# Patient Record
Sex: FEMALE | Race: BLACK OR AFRICAN AMERICAN | NOT HISPANIC OR LATINO
[De-identification: names, ages, dates, MRNs, and addresses within clinical notes are randomized per-mention and may not be internally consistent; named-entity substitution may affect disease eponyms.]

---

## 2017-02-13 PROBLEM — Z00.00 ENCOUNTER FOR PREVENTIVE HEALTH EXAMINATION: Status: ACTIVE | Noted: 2017-02-13

## 2017-02-14 ENCOUNTER — APPOINTMENT (OUTPATIENT)
Dept: PULMONOLOGY | Facility: CLINIC | Age: 77
End: 2017-02-14

## 2017-02-14 VITALS
WEIGHT: 154 LBS | BODY MASS INDEX: 30.23 KG/M2 | DIASTOLIC BLOOD PRESSURE: 102 MMHG | SYSTOLIC BLOOD PRESSURE: 152 MMHG | TEMPERATURE: 98.1 F | OXYGEN SATURATION: 88 % | HEIGHT: 60 IN | HEART RATE: 72 BPM

## 2017-02-14 RX ORDER — SODIUM CHLORIDE 0.65 %
0.65 AEROSOL, SPRAY (ML) NASAL TWICE DAILY
Qty: 1 | Refills: 0 | Status: ACTIVE | COMMUNITY
Start: 2017-02-14 | End: 1900-01-01

## 2017-02-14 RX ORDER — AZELASTINE HYDROCHLORIDE 137 UG/1
0.1 SPRAY, METERED NASAL TWICE DAILY
Qty: 1 | Refills: 3 | Status: ACTIVE | COMMUNITY
Start: 2017-02-14 | End: 1900-01-01

## 2017-02-20 VITALS — DIASTOLIC BLOOD PRESSURE: 92 MMHG | SYSTOLIC BLOOD PRESSURE: 156 MMHG

## 2017-03-17 ENCOUNTER — RX RENEWAL (OUTPATIENT)
Age: 77
End: 2017-03-17

## 2017-03-21 ENCOUNTER — APPOINTMENT (OUTPATIENT)
Dept: PULMONOLOGY | Facility: CLINIC | Age: 77
End: 2017-03-21

## 2017-04-04 ENCOUNTER — APPOINTMENT (OUTPATIENT)
Dept: PULMONOLOGY | Facility: CLINIC | Age: 77
End: 2017-04-04

## 2017-04-11 ENCOUNTER — APPOINTMENT (OUTPATIENT)
Dept: PULMONOLOGY | Facility: CLINIC | Age: 77
End: 2017-04-11

## 2017-04-11 VITALS
HEIGHT: 60 IN | BODY MASS INDEX: 30.23 KG/M2 | OXYGEN SATURATION: 91 % | WEIGHT: 154 LBS | DIASTOLIC BLOOD PRESSURE: 88 MMHG | HEART RATE: 69 BPM | SYSTOLIC BLOOD PRESSURE: 122 MMHG

## 2017-05-02 ENCOUNTER — RX RENEWAL (OUTPATIENT)
Age: 77
End: 2017-05-02

## 2017-06-13 ENCOUNTER — APPOINTMENT (OUTPATIENT)
Dept: PULMONOLOGY | Facility: CLINIC | Age: 77
End: 2017-06-13

## 2017-06-13 ENCOUNTER — OTHER (OUTPATIENT)
Age: 77
End: 2017-06-13

## 2017-06-13 VITALS
HEART RATE: 67 BPM | WEIGHT: 168 LBS | BODY MASS INDEX: 32.98 KG/M2 | OXYGEN SATURATION: 83 % | DIASTOLIC BLOOD PRESSURE: 90 MMHG | SYSTOLIC BLOOD PRESSURE: 126 MMHG | HEIGHT: 60 IN

## 2017-06-13 VITALS — OXYGEN SATURATION: 90 %

## 2017-06-23 ENCOUNTER — RX RENEWAL (OUTPATIENT)
Age: 77
End: 2017-06-23

## 2017-08-18 ENCOUNTER — RECORD ABSTRACTING (OUTPATIENT)
Age: 77
End: 2017-08-18

## 2017-08-18 DIAGNOSIS — Z87.891 PERSONAL HISTORY OF NICOTINE DEPENDENCE: ICD-10-CM

## 2020-10-07 ENCOUNTER — APPOINTMENT (OUTPATIENT)
Dept: PULMONOLOGY | Facility: CLINIC | Age: 80
End: 2020-10-07
Payer: MEDICARE

## 2020-10-07 VITALS — SYSTOLIC BLOOD PRESSURE: 102 MMHG | DIASTOLIC BLOOD PRESSURE: 61 MMHG

## 2020-10-07 VITALS — TEMPERATURE: 97.5 F | RESPIRATION RATE: 16 BRPM

## 2020-10-07 DIAGNOSIS — J44.1 CHRONIC OBSTRUCTIVE PULMONARY DISEASE WITH (ACUTE) EXACERBATION: ICD-10-CM

## 2020-10-07 PROCEDURE — 99215 OFFICE O/P EST HI 40 MIN: CPT

## 2020-10-08 PROBLEM — J44.1 COPD WITH EXACERBATION: Status: ACTIVE | Noted: 2020-10-08

## 2020-10-08 NOTE — REVIEW OF SYSTEMS
[Cough] : cough [Dyspnea] : dyspnea [Fever] : no fever [Nasal Congestion] : no nasal congestion [Postnasal Drip] : no postnasal drip [Chest Discomfort] : no chest discomfort [Palpitations] : no palpitations [Hay Fever] : no hay fever

## 2020-10-08 NOTE — DISCUSSION/SUMMARY
[FreeTextEntry1] : Acute respiratory failure, COPD\par \par I suspect she may have hypercapnic as well as hypoxic respiratory failure.\par \par She may have element of CHF and pulmonary HTN\par \par Rule out MI, rule out PE, rule out CHF, pneumonia\par \par PLAN\par \par i have recommended she be transferred to the ER for further testing.\par \par Pt agreed EMS called and have transported her\par \par i have discussed her care with ER at [facility].\par \par \par Angel Amador MD [facility]

## 2020-10-08 NOTE — PHYSICAL EXAM
[No Acute Distress] : no acute distress [II] : Mallampati Class: II [Normal Appearance] : normal appearance [No Neck Mass] : no neck mass [No JVD] : no jvd [Normal Rate/Rhythm] : normal rate/rhythm [Normal S1, S2] : normal s1, s2 [No Murmurs] : no murmurs [Benign] : benign [No Masses] : no masses [No HSM] : no hsm [Normal Bowel Sounds] : normal bowel sounds [No Clubbing] : no clubbing [No Edema] : no edema [Oriented x3] : oriented x3 [TextBox_68] : diminished distant, unlabored

## 2020-10-08 NOTE — HISTORY OF PRESENT ILLNESS
[TextBox_4] : 80 year old woman last seen in 2017.  she has bee treated for COPD.  She has HTN and gout.  A CXR in past suggested pulmonary congestion.  She has followed with her primary doctor, Lobo Bonilla.\par She has been using albuterol and Arnuity.  she also takes furosemide 80 mg per day, allopurinol 100 mg and amlodipine 10 mg per day.  her BP has been in better control.\par \par She has had increased dyspnea for about 4 weeks, with some sputum production.  She denies chest pain or tightness, pedal edema, fever.\par \par \par She has had borderline O2 saturation 90% in past.  During prior visits, her respiratory status had appeared to improve. \par \par Today she presented and found to have O2 saturation on pulse oximeter of 46%, confirmed on 2 oximeters.  It doug to 92 % on 4 l NC\par \par She is awake alert and comfortable.\par \par

## 2020-10-21 ENCOUNTER — APPOINTMENT (OUTPATIENT)
Dept: PULMONOLOGY | Facility: CLINIC | Age: 80
End: 2020-10-21
Payer: MEDICARE

## 2020-10-21 VITALS
HEART RATE: 87 BPM | BODY MASS INDEX: 29.3 KG/M2 | SYSTOLIC BLOOD PRESSURE: 148 MMHG | DIASTOLIC BLOOD PRESSURE: 72 MMHG | TEMPERATURE: 97.9 F | WEIGHT: 150 LBS | OXYGEN SATURATION: 69 %

## 2020-10-21 VITALS — OXYGEN SATURATION: 98 % | HEART RATE: 71 BPM

## 2020-10-21 PROCEDURE — 99072 ADDL SUPL MATRL&STAF TM PHE: CPT

## 2020-10-21 PROCEDURE — 99214 OFFICE O/P EST MOD 30 MIN: CPT | Mod: 25

## 2020-10-22 NOTE — DISCUSSION/SUMMARY
[FreeTextEntry1] : COPD, emphysema interstitial lung disease\par \par 8 mm right lung nodule\par \par PLAN\par \par Increase furosemide to 20 mg BID, continue spironolactone 25 mg per day\par \par Start on Symbicort  2 puffs twice per day with spacer\par \par stop Arnuity\par \par use albuterol MDI  as needed\par \par use oxygen 2-3 lpm at all times\par \par BIPAP at night\par \par monitor your oxygen level.  It should be over 88%\par \par Start nebulized albuterol.\par \par Angel Amador MD [facility] \par \par \par

## 2020-10-22 NOTE — PHYSICAL EXAM
[No Acute Distress] : no acute distress [II] : Mallampati Class: II [Normal Appearance] : normal appearance [No Neck Mass] : no neck mass [No JVD] : no jvd [Normal Rate/Rhythm] : normal rate/rhythm [Normal S1, S2] : normal s1, s2 [No Murmurs] : no murmurs [Benign] : benign [No Masses] : no masses [No HSM] : no hsm [Normal Bowel Sounds] : normal bowel sounds [No Clubbing] : no clubbing [Oriented x3] : oriented x3 [TextBox_68] : diminished distant, unlabored [TextBox_105] : 2+ edema of ankles

## 2020-10-22 NOTE — HISTORY OF PRESENT ILLNESS
[TextBox_4] : She was hospitalized at [facility].  she was diagnosed with CHF, COPD/emphysema, hypoxic hypercapnic chronic respiratory failure.  She was started on BIPAP and was discharge on furosemide, spironolactone and bronchodilators.  Lower extremity edema resolved.\par \par Echo demonstrated diastolic dysfunction,hyperdynamic LV. \par \par She returns follow up, not using portable oxygen.  O2 sat 69%, not in distress.  She was put on oxygen in our office.\par \par She has noted increased lower extremity edema since hospital discharge\par \par

## 2020-11-11 ENCOUNTER — APPOINTMENT (OUTPATIENT)
Dept: PULMONOLOGY | Facility: CLINIC | Age: 80
End: 2020-11-11
Payer: MEDICARE

## 2020-11-11 VITALS
RESPIRATION RATE: 17 BRPM | HEART RATE: 94 BPM | SYSTOLIC BLOOD PRESSURE: 102 MMHG | OXYGEN SATURATION: 86 % | TEMPERATURE: 97 F | DIASTOLIC BLOOD PRESSURE: 70 MMHG

## 2020-11-11 VITALS — OXYGEN SATURATION: 86 %

## 2020-11-11 DIAGNOSIS — Z23 ENCOUNTER FOR IMMUNIZATION: ICD-10-CM

## 2020-11-11 PROCEDURE — 90732 PPSV23 VACC 2 YRS+ SUBQ/IM: CPT

## 2020-11-11 PROCEDURE — G0009: CPT

## 2020-11-11 PROCEDURE — 99072 ADDL SUPL MATRL&STAF TM PHE: CPT

## 2020-11-11 PROCEDURE — 99214 OFFICE O/P EST MOD 30 MIN: CPT | Mod: 25

## 2020-11-11 RX ORDER — FLUTICASONE FUROATE 100 UG/1
100 POWDER RESPIRATORY (INHALATION) DAILY
Qty: 1 | Refills: 2 | Status: DISCONTINUED | COMMUNITY
Start: 2017-06-23 | End: 2020-11-11

## 2020-11-12 NOTE — REASON FOR VISIT
[Follow-Up] : a follow-up visit [COPD] : COPD [TextBox_44] : hypoventilation syndrome,LE edema, hypoxia, chronic respiratory failure

## 2020-11-12 NOTE — PHYSICAL EXAM
[No Acute Distress] : no acute distress [II] : Mallampati Class: II [Normal Appearance] : normal appearance [No Neck Mass] : no neck mass [No JVD] : no jvd [Normal Rate/Rhythm] : normal rate/rhythm [Normal S1, S2] : normal s1, s2 [No Murmurs] : no murmurs [Benign] : benign [No Masses] : no masses [No HSM] : no hsm [Normal Bowel Sounds] : normal bowel sounds [No Clubbing] : no clubbing [Oriented x3] : oriented x3 [TextBox_68] : diminished distant, unlabored [TextBox_105] : 1-2+ edema of ankles and pretibilal

## 2020-11-12 NOTE — DISCUSSION/SUMMARY
[FreeTextEntry1] : COPD, emphysema interstitial lung disease\par \par hypercapnic chronic respiratory failure, with hypoxia\par \par Ct has shown 8 mm right lung nodule\par \par PLAN\par \par Continue furosemide to 40 mg BID, spironolactone 25 mg per day\par \par Continue Symbicort 160/4.5 2 p twice per day with spacer\par \par Duoneb twice per day via nebulizer\par \par \par She has had the influenza vaccine this season. in October\par \par Given Pneumovax 23 today\par \par [facility]

## 2020-11-12 NOTE — HISTORY OF PRESENT ILLNESS
[TextBox_4] : 80 year old woman returns for follow up.\par \par She was hospitalized at [facility].  She was diagnosed with CHF, COPD/emphysema, hypoxic hypercapnic chronic respiratory failure.  She was started on BIPAP and was discharged on furosemide, spironolactone and bronchodilators.  Lower extremity edema resolved.\par \par Echo demonstrated diastolic dysfunction,hyperdynamic LV. \par \par She returns follow up,  O2 sat 86 % on room air, improved. \par \par She has noted increased lower extremity edema since hospital discharge, somewhat better now on increased dose of diuretic. \par \par \par She is able to wear shoes.  \par \par She has not received portable oxygen and has not received albuterol solution.\par \par \par \par PMH\par \par HTN

## 2020-12-07 RX ORDER — TIOTROPIUM BROMIDE 18 UG/1
18 CAPSULE ORAL; RESPIRATORY (INHALATION) DAILY
Qty: 1 | Refills: 3 | Status: DISCONTINUED | COMMUNITY
Start: 2017-02-14 | End: 2020-12-07

## 2021-01-13 ENCOUNTER — APPOINTMENT (OUTPATIENT)
Dept: PULMONOLOGY | Facility: CLINIC | Age: 81
End: 2021-01-13
Payer: COMMERCIAL

## 2021-01-13 VITALS
SYSTOLIC BLOOD PRESSURE: 102 MMHG | DIASTOLIC BLOOD PRESSURE: 81 MMHG | TEMPERATURE: 98 F | WEIGHT: 146 LBS | RESPIRATION RATE: 18 BRPM | HEIGHT: 60 IN | HEART RATE: 81 BPM | BODY MASS INDEX: 28.66 KG/M2 | OXYGEN SATURATION: 88 %

## 2021-01-13 PROCEDURE — 99213 OFFICE O/P EST LOW 20 MIN: CPT

## 2021-01-18 LAB
ALBUMIN SERPL ELPH-MCNC: 4.7 G/DL
ALP BLD-CCNC: 118 U/L
ALT SERPL-CCNC: 13 U/L
ANION GAP SERPL CALC-SCNC: 16 MMOL/L
AST SERPL-CCNC: 16 U/L
BASOPHILS # BLD AUTO: 0.08 K/UL
BASOPHILS NFR BLD AUTO: 1.3 %
BILIRUB SERPL-MCNC: 0.4 MG/DL
BUN SERPL-MCNC: 27 MG/DL
CALCIUM SERPL-MCNC: 10 MG/DL
CHLORIDE SERPL-SCNC: 97 MMOL/L
CO2 SERPL-SCNC: 26 MMOL/L
CREAT SERPL-MCNC: 1.68 MG/DL
EOSINOPHIL # BLD AUTO: 0.41 K/UL
EOSINOPHIL NFR BLD AUTO: 6.7 %
GLUCOSE SERPL-MCNC: 96 MG/DL
HCT VFR BLD CALC: 44.9 %
HGB BLD-MCNC: 13.6 G/DL
IMM GRANULOCYTES NFR BLD AUTO: 0.2 %
LYMPHOCYTES # BLD AUTO: 1.91 K/UL
LYMPHOCYTES NFR BLD AUTO: 31.4 %
MAN DIFF?: NORMAL
MCHC RBC-ENTMCNC: 26.6 PG
MCHC RBC-ENTMCNC: 30.3 GM/DL
MCV RBC AUTO: 87.7 FL
MONOCYTES # BLD AUTO: 0.52 K/UL
MONOCYTES NFR BLD AUTO: 8.5 %
NEUTROPHILS # BLD AUTO: 3.16 K/UL
NEUTROPHILS NFR BLD AUTO: 51.9 %
PLATELET # BLD AUTO: 209 K/UL
POTASSIUM SERPL-SCNC: 4.5 MMOL/L
PROT SERPL-MCNC: 7 G/DL
RBC # BLD: 5.12 M/UL
RBC # FLD: 20.9 %
SODIUM SERPL-SCNC: 139 MMOL/L
WBC # FLD AUTO: 6.09 K/UL

## 2021-03-10 ENCOUNTER — APPOINTMENT (OUTPATIENT)
Dept: PULMONOLOGY | Facility: CLINIC | Age: 81
End: 2021-03-10
Payer: MEDICARE

## 2021-03-10 VITALS
OXYGEN SATURATION: 93 % | RESPIRATION RATE: 16 BRPM | DIASTOLIC BLOOD PRESSURE: 80 MMHG | TEMPERATURE: 96 F | HEART RATE: 86 BPM | SYSTOLIC BLOOD PRESSURE: 118 MMHG

## 2021-03-10 PROCEDURE — 99072 ADDL SUPL MATRL&STAF TM PHE: CPT

## 2021-03-10 PROCEDURE — 99213 OFFICE O/P EST LOW 20 MIN: CPT

## 2021-03-11 ENCOUNTER — TRANSCRIPTION ENCOUNTER (OUTPATIENT)
Age: 81
End: 2021-03-11

## 2021-03-14 ENCOUNTER — NON-APPOINTMENT (OUTPATIENT)
Age: 81
End: 2021-03-14

## 2021-03-20 LAB
25(OH)D3 SERPL-MCNC: 138 NG/ML
ALBUMIN SERPL ELPH-MCNC: 4.2 G/DL
ALP BLD-CCNC: 100 U/L
ALT SERPL-CCNC: 10 U/L
ANION GAP SERPL CALC-SCNC: 14 MMOL/L
AST SERPL-CCNC: 15 U/L
BASOPHILS # BLD AUTO: 0.06 K/UL
BASOPHILS NFR BLD AUTO: 1 %
BILIRUB SERPL-MCNC: 0.2 MG/DL
BUN SERPL-MCNC: 22 MG/DL
CALCIUM SERPL-MCNC: 9.5 MG/DL
CHLORIDE SERPL-SCNC: 103 MMOL/L
CO2 SERPL-SCNC: 24 MMOL/L
CREAT SERPL-MCNC: 1.1 MG/DL
EOSINOPHIL # BLD AUTO: 0.35 K/UL
EOSINOPHIL NFR BLD AUTO: 6.1 %
GLUCOSE SERPL-MCNC: 87 MG/DL
HCT VFR BLD CALC: 38.5 %
HGB BLD-MCNC: 11.6 G/DL
IMM GRANULOCYTES NFR BLD AUTO: 0.2 %
LYMPHOCYTES # BLD AUTO: 1.4 K/UL
LYMPHOCYTES NFR BLD AUTO: 24.2 %
MAN DIFF?: NORMAL
MCHC RBC-ENTMCNC: 28.7 PG
MCHC RBC-ENTMCNC: 30.1 GM/DL
MCV RBC AUTO: 95.3 FL
MONOCYTES # BLD AUTO: 0.47 K/UL
MONOCYTES NFR BLD AUTO: 8.1 %
NEUTROPHILS # BLD AUTO: 3.49 K/UL
NEUTROPHILS NFR BLD AUTO: 60.4 %
PLATELET # BLD AUTO: 208 K/UL
POTASSIUM SERPL-SCNC: 4.4 MMOL/L
PROT SERPL-MCNC: 6.2 G/DL
RBC # BLD: 4.04 M/UL
RBC # FLD: 13.4 %
SODIUM SERPL-SCNC: 142 MMOL/L
TSH SERPL-ACNC: 1.86 UIU/ML
WBC # FLD AUTO: 5.78 K/UL

## 2021-04-05 ENCOUNTER — NON-APPOINTMENT (OUTPATIENT)
Age: 81
End: 2021-04-05

## 2021-06-09 RX ORDER — AMLODIPINE BESYLATE 10 MG/1
10 TABLET ORAL DAILY
Qty: 90 | Refills: 1 | Status: ACTIVE | COMMUNITY
Start: 2021-06-09 | End: 1900-01-01

## 2021-06-09 RX ORDER — AMLODIPINE BESYLATE 5 MG/1
5 TABLET ORAL
Qty: 30 | Refills: 2 | Status: DISCONTINUED | COMMUNITY
Start: 2017-02-14 | End: 2021-06-09

## 2021-07-07 ENCOUNTER — NON-APPOINTMENT (OUTPATIENT)
Age: 81
End: 2021-07-07

## 2021-07-07 ENCOUNTER — APPOINTMENT (OUTPATIENT)
Dept: OPHTHALMOLOGY | Facility: CLINIC | Age: 81
End: 2021-07-07
Payer: MEDICARE

## 2021-07-07 PROCEDURE — 99072 ADDL SUPL MATRL&STAF TM PHE: CPT

## 2021-07-07 PROCEDURE — 92004 COMPRE OPH EXAM NEW PT 1/>: CPT

## 2021-08-10 ENCOUNTER — APPOINTMENT (OUTPATIENT)
Dept: OPHTHALMOLOGY | Facility: CLINIC | Age: 81
End: 2021-08-10

## 2021-08-17 ENCOUNTER — APPOINTMENT (OUTPATIENT)
Dept: OPHTHALMOLOGY | Facility: CLINIC | Age: 81
End: 2021-08-17
Payer: MEDICARE

## 2021-08-17 ENCOUNTER — NON-APPOINTMENT (OUTPATIENT)
Age: 81
End: 2021-08-17

## 2021-08-17 PROCEDURE — 92136 OPHTHALMIC BIOMETRY: CPT

## 2021-08-17 PROCEDURE — 92012 INTRM OPH EXAM EST PATIENT: CPT

## 2021-08-20 ENCOUNTER — APPOINTMENT (OUTPATIENT)
Dept: OPHTHALMOLOGY | Facility: EYE CENTER | Age: 81
End: 2021-08-20
Payer: MEDICARE

## 2021-08-20 ENCOUNTER — NON-APPOINTMENT (OUTPATIENT)
Age: 81
End: 2021-08-20

## 2021-08-20 PROCEDURE — 66982 XCAPSL CTRC RMVL CPLX WO ECP: CPT | Mod: LT

## 2021-08-21 ENCOUNTER — APPOINTMENT (OUTPATIENT)
Dept: OPHTHALMOLOGY | Facility: CLINIC | Age: 81
End: 2021-08-21
Payer: MEDICARE

## 2021-08-21 ENCOUNTER — NON-APPOINTMENT (OUTPATIENT)
Age: 81
End: 2021-08-21

## 2021-08-21 PROCEDURE — 99024 POSTOP FOLLOW-UP VISIT: CPT

## 2021-08-25 ENCOUNTER — APPOINTMENT (OUTPATIENT)
Dept: OPHTHALMOLOGY | Facility: CLINIC | Age: 81
End: 2021-08-25
Payer: MEDICARE

## 2021-08-25 ENCOUNTER — NON-APPOINTMENT (OUTPATIENT)
Age: 81
End: 2021-08-25

## 2021-08-25 PROCEDURE — 99024 POSTOP FOLLOW-UP VISIT: CPT

## 2021-09-15 ENCOUNTER — APPOINTMENT (OUTPATIENT)
Dept: PULMONOLOGY | Facility: CLINIC | Age: 81
End: 2021-09-15
Payer: MEDICARE

## 2021-09-15 VITALS
WEIGHT: 144 LBS | HEART RATE: 92 BPM | DIASTOLIC BLOOD PRESSURE: 70 MMHG | OXYGEN SATURATION: 91 % | TEMPERATURE: 98.4 F | SYSTOLIC BLOOD PRESSURE: 116 MMHG | BODY MASS INDEX: 28.12 KG/M2

## 2021-09-15 PROCEDURE — 99214 OFFICE O/P EST MOD 30 MIN: CPT

## 2021-09-16 NOTE — HISTORY OF PRESENT ILLNESS
[TextBox_4] : 80 year old woman returns for follow up.\par \par She was hospitalized at [facility].  She was diagnosed with CHF, COPD/emphysema, hypoxic hypercapnic chronic respiratory failure.  She was started on BIPAP and was discharged on home NIV, furosemide, spironolactone and bronchodilators.  Lower extremity edema resolved.\par \par Echo demonstrated diastolic dysfunction,hyperdynamic LV. \par \par She has been on diuretic.  Edema much improved at this time.\par \par She has not received portable oxygen and has not received albuterol solution.\par \par She has been using Symbicort only one puff per day. \par \par She uses nebulized albuterol/ipratropium.\par \par She has portable oxygen tank but wants [employer], portable concentrator.\par \par She remains on diuretic.\par \par PMH\par \par HTN

## 2021-09-16 NOTE — HISTORY OF PRESENT ILLNESS
[TextBox_4] : 81 year old woman returns for follow up.\par \par She was hospitalized at [facility].  She was diagnosed with CHF, COPD/emphysema, hypoxic hypercapnic chronic respiratory failure.  She was started on BIPAP and was discharged on home NIV, furosemide, spironolactone and bronchodilators.  Lower extremity edema resolved.\par \par Echo demonstrated diastolic dysfunction,hyperdynamic LV. \par \par She has been on diuretic.  Edema much improved at this time.\par \par She has not received portable oxygen and has not received albuterol solution.\par \par She has been using Symbicort only one puff per day. \par \par She uses nebulized albuterol/ipratropium.\par \par She has portable oxygen tank but wants [employer], portable concentrator.\par \par She remains on diuretic.\par \par She has no pedal edema\par \par She still has dyspnea and hypoxia on exertion.  She has been stable likely improved\par \par She has not been using NIV as it has been recalled by [employer].  She has tolerated this so far. \par \par She has some rhinitis\par \par She cannot use BPAP due to recall.  she has contacted [employer].\par \par She uses oxygen, walks well.\par \par Off oxygen, on room air her oxygen saturation drops to 88%.  I improves to 97% on oxygen 3 lpm\par \par She has an 8 mm lung nodule being followed with CT chest.  Stable on CT 4/2021.\par \par PMH\par \par HTN

## 2021-09-16 NOTE — PROCEDURE
[FreeTextEntry1] : \par \par She cannot use BPAP due to recall.  she has contacted [employer].\par \par She uses oxygen, walks well.\par \par Off oxygen, on room air her oxygen saturation drops to 88%.  I improves to 97% on oxygen 3 lpm No pertinent past medical history

## 2021-09-16 NOTE — PROCEDURE
[FreeTextEntry1] : \par \par She cannot use BPAP due to recall.  she has contacted [employer].\par \par She uses oxygen, walks well.\par \par Off oxygen, on room air her oxygen saturation drops to 88%.  I improves to 97% on oxygen 3 lpm

## 2021-09-16 NOTE — DISCUSSION/SUMMARY
[FreeTextEntry1] : COPD, emphysema interstitial lung disease\par \par hypercapnic chronic respiratory failure, with hypoxia\par \par CT has shown 8 mm right lung nodule\par \par She has not gone for follow up CT chest\par \par She using NIV regularly at home\par \par PLAN\par \par Continue furosemide to 40 mg BID, spironolactone 25 mg per day\par \par Continue Symbicort 160/4.5 2 p twice per day with spacer\par \par Duoneb twice per day via nebulizer\par \par Continue on NIV at home. Encourage to use as much as possible. \par \par She has had the influenza vaccine this season in October\par \par check blood test, renal function electrolytes\par \par Reorder repeat CT chest to evaluate lung nodule.\par \par [facility]

## 2021-09-16 NOTE — PHYSICAL EXAM
[No Acute Distress] : no acute distress [II] : Mallampati Class: II [Normal Appearance] : normal appearance [No Neck Mass] : no neck mass [No JVD] : no jvd [Normal Rate/Rhythm] : normal rate/rhythm [Normal S1, S2] : normal s1, s2 [No Murmurs] : no murmurs [Benign] : benign [No Masses] : no masses [No HSM] : no hsm [Normal Bowel Sounds] : normal bowel sounds [No Clubbing] : no clubbing [Oriented x3] : oriented x3 [TextBox_68] : diminished distant, unlabored  few basilar crackles [TextBox_105] : 1-2+ edema of ankles and pretibilal

## 2021-09-16 NOTE — DISCUSSION/SUMMARY
[FreeTextEntry1] : COPD, emphysema interstitial lung disease\par \par hypercapnic chronic respiratory failure, with hypoxia\par \par CT has shown 8 mm right lung nodule\par \par PLAN\par \par Continue furosemide to 40 mg BID, spironolactone 25 mg per day\par \par Continue Symbicort 160/4.5 2 p twice per day with spacer\par \par Duoneb twice per day via nebulizer\par \par Continue on NIV at home. Encourage to use as much as possible. \par \par She has had the influenza vaccine this season. in October\par \par \par \par check blood test, renal function electrolytes\par \par repeat CT chest to evaluate lung nodule.\par \par [facility] 
(2) good, crying

## 2021-09-16 NOTE — HISTORY OF PRESENT ILLNESS
[TextBox_4] : 80 year old woman returns for follow up.\par \par She was hospitalized at [facility].  She was diagnosed with CHF, COPD/emphysema, hypoxic hypercapnic chronic respiratory failure.  She was started on BIPAP and was discharged on home NIV, furosemide, spironolactone and bronchodilators.  Lower extremity edema resolved.\par \par Echo demonstrated diastolic dysfunction,hyperdynamic LV. \par \par She has been on diuretic.  Edema much improved at this time.\par \par She has not received portable oxygen and has not received albuterol solution.\par \par She has been using Symbicort only one puff per day. \par \par She uses nebulized albuterol/ipratropium.\par \par She has portable oxygen tank but wants [employer], portable concentrator.\par \par She remains on diuretic.\par \par She has no pedal edema\par \par She still has dyspnea and hypoxia on exertion.  She has been stable likely improved\par \par She uses oxygen 3 lpm\par \par Tolerates off NC with saturation 90-92 % at rest\par \par \par \par PMH\par \par HTN

## 2021-09-16 NOTE — DISCUSSION/SUMMARY
[FreeTextEntry1] : COPD, emphysema interstitial lung disease\par \par hypercapnic chronic respiratory failure, with hypoxia\par \par CT has shown 8 mm right lung nodule followed since 2020, stable on recent CT chest \par \par PLAN\par \par Continue furosemide to 40 mg QD, spironolactone 25 mg per day\par \par Continue Symbicort 160/4.5 using nurse\par \par Duoneb twice per day via nebulizer\par \par She has stopped using  NIV at home due to recall. I  am process of having this repaired or replaced and also working on getting portable concentrator.\par \par she will have repeat Ct chest in about 3 months December 2021 to follow up the lung nodule\par \par She has had the influenza vaccine this season. in October\par \par She has had  Pneumovax 23 \par \par Total time spent : 30 minutes\par Including:\par Preparation prior to visit - Reviewing prior record, results of tests and Consultation Reports as applicable\par Conducting an appropriate H & P during today's encounter\par Appropriate orders for tests, medications and procedures, as applicable\par Counseling patient \par Note completion \par \par [facility] \par \par

## 2021-09-16 NOTE — DISCUSSION/SUMMARY
[FreeTextEntry1] : COPD, emphysema interstitial lung disease\par \par hypercapnic chronic respiratory failure, with hypoxia\par \par CT has shown 8 mm right lung nodule followed since 2020, stable on recent CT chest \par \par PLAN\par \par Continue furosemide to 40 mg QD, spironolactone 25 mg per day\par \par Continue Symbicort 160/4.5 using nurse\par \par Duoneb twice per day via nebulizer\par \par She has stopped using  NIV at home due to recall. I  am process of having this repaired or replaced and also working on getting portable concentrator.\par \par she will have repeat Ct chest in about 3 months December 2021 to follow up the lung nodule\par \par She has had the influenza vaccine this season. in October\par \par She has had  Pneumovax 23 \par \par Total time spent : 30 minutes\par Including:\par Preparation prior to visit - Reviewing prior record, results of tests and Consultation Reports as applicable\par Conducting an appropriate H & P during today's encounter\par Appropriate orders for tests, medications and procedures, as applicable\par Counseling patient \par Note completion \par \par [facility] \par \par

## 2021-09-16 NOTE — HISTORY OF PRESENT ILLNESS
[TextBox_4] : 81 year old woman returns for follow up.\par \par She was hospitalized at [facility].  She was diagnosed with CHF, COPD/emphysema, hypoxic hypercapnic chronic respiratory failure.  She was started on BIPAP and was discharged on home NIV, furosemide, spironolactone and bronchodilators.  Lower extremity edema resolved.\par \par Echo demonstrated diastolic dysfunction,hyperdynamic LV. \par \par She has been on diuretic.  Edema much improved at this time.\par \par She has not received portable oxygen and has not received albuterol solution.\par \par She has been using Symbicort only one puff per day. \par \par She uses nebulized albuterol/ipratropium.\par \par She has portable oxygen tank but wants [employer], portable concentrator.\par \par She remains on diuretic.\par \par She has no pedal edema\par \par She still has dyspnea and hypoxia on exertion.  She has been stable likely improved\par \par She has not been using NIV as it has been recalled by [employer].  She has tolerated this so far. \par \par She has some rhinitis\par \par She cannot use BPAP due to recall.  she has contacted [employer].\par \par She uses oxygen, walks well.\par \par Off oxygen, on room air her oxygen saturation drops to 88%.  I improves to 97% on oxygen 3 lpm\par \par She has an 8 mm lung nodule being followed with CT chest.  Stable on CT 4/2021.\par \par PMH\par \par HTN

## 2021-09-29 ENCOUNTER — NON-APPOINTMENT (OUTPATIENT)
Age: 81
End: 2021-09-29

## 2021-09-29 ENCOUNTER — APPOINTMENT (OUTPATIENT)
Dept: OPHTHALMOLOGY | Facility: CLINIC | Age: 81
End: 2021-09-29
Payer: MEDICARE

## 2021-09-29 PROCEDURE — 92015 DETERMINE REFRACTIVE STATE: CPT | Mod: NC

## 2021-09-29 PROCEDURE — 99024 POSTOP FOLLOW-UP VISIT: CPT

## 2021-12-15 ENCOUNTER — APPOINTMENT (OUTPATIENT)
Dept: PULMONOLOGY | Facility: CLINIC | Age: 81
End: 2021-12-15
Payer: MEDICARE

## 2021-12-15 VITALS — HEART RATE: 70 BPM | OXYGEN SATURATION: 98 %

## 2021-12-15 VITALS
DIASTOLIC BLOOD PRESSURE: 68 MMHG | BODY MASS INDEX: 27.54 KG/M2 | SYSTOLIC BLOOD PRESSURE: 108 MMHG | HEART RATE: 88 BPM | TEMPERATURE: 98.6 F | WEIGHT: 141 LBS | OXYGEN SATURATION: 77 %

## 2021-12-15 PROCEDURE — 99215 OFFICE O/P EST HI 40 MIN: CPT

## 2021-12-16 LAB
ALBUMIN SERPL ELPH-MCNC: 4.6 G/DL
ALP BLD-CCNC: 119 U/L
ALT SERPL-CCNC: 9 U/L
ANION GAP SERPL CALC-SCNC: 11 MMOL/L
AST SERPL-CCNC: 14 U/L
BASOPHILS # BLD AUTO: 0.07 K/UL
BASOPHILS NFR BLD AUTO: 1.3 %
BILIRUB SERPL-MCNC: 0.4 MG/DL
BUN SERPL-MCNC: 19 MG/DL
CALCIUM SERPL-MCNC: 10.1 MG/DL
CHLORIDE SERPL-SCNC: 95 MMOL/L
CHOLEST SERPL-MCNC: 252 MG/DL
CO2 SERPL-SCNC: 33 MMOL/L
CREAT SERPL-MCNC: 1.28 MG/DL
EOSINOPHIL # BLD AUTO: 0.43 K/UL
EOSINOPHIL NFR BLD AUTO: 7.8 %
GLUCOSE SERPL-MCNC: 89 MG/DL
HCT VFR BLD CALC: 40.2 %
HDLC SERPL-MCNC: 99 MG/DL
HGB BLD-MCNC: 12.4 G/DL
IMM GRANULOCYTES NFR BLD AUTO: 0.2 %
LDLC SERPL CALC-MCNC: 137 MG/DL
LYMPHOCYTES # BLD AUTO: 1.46 K/UL
LYMPHOCYTES NFR BLD AUTO: 26.5 %
MAN DIFF?: NORMAL
MCHC RBC-ENTMCNC: 28.6 PG
MCHC RBC-ENTMCNC: 30.8 GM/DL
MCV RBC AUTO: 92.8 FL
MONOCYTES # BLD AUTO: 0.61 K/UL
MONOCYTES NFR BLD AUTO: 11.1 %
NEUTROPHILS # BLD AUTO: 2.92 K/UL
NEUTROPHILS NFR BLD AUTO: 53.1 %
NONHDLC SERPL-MCNC: 154 MG/DL
PLATELET # BLD AUTO: 195 K/UL
POTASSIUM SERPL-SCNC: 3.8 MMOL/L
PROT SERPL-MCNC: 6.9 G/DL
RBC # BLD: 4.33 M/UL
RBC # FLD: 13.1 %
SODIUM SERPL-SCNC: 138 MMOL/L
TRIGL SERPL-MCNC: 83 MG/DL
WBC # FLD AUTO: 5.5 K/UL

## 2021-12-17 NOTE — PHYSICAL EXAM
[No Acute Distress] : no acute distress [II] : Mallampati Class: II [Normal Appearance] : normal appearance [No Neck Mass] : no neck mass [No JVD] : no jvd [Normal Rate/Rhythm] : normal rate/rhythm [Normal S1, S2] : normal s1, s2 [No Murmurs] : no murmurs [Benign] : benign [No Masses] : no masses [No HSM] : no hsm [Normal Bowel Sounds] : normal bowel sounds [No Clubbing] : no clubbing [Oriented x3] : oriented x3 [TextBox_68] : diminished distant, unlabored  few basilar crackles [TextBox_105] : minimal edema

## 2021-12-17 NOTE — REASON FOR VISIT
[Follow-Up] : a follow-up visit [COPD] : COPD [TextBox_44] : chronic hypercapnic hypoxic respiratory failure

## 2021-12-17 NOTE — HISTORY OF PRESENT ILLNESS
[TextBox_4] : 81 year old woman returns for follow up.\par \par She was hospitalized at [facility].  She was diagnosed with CHF, COPD/emphysema, hypoxic hypercapnic chronic respiratory failure.  She was started on BIPAP and was discharged on home NIV, furosemide, spironolactone and bronchodilators.  Lower extremity edema resolved.\par \par Echo demonstrated diastolic dysfunction,hyperdynamic LV. \par \par She has been on diuretic.  Edema resolved at this time.\par \par She is using portable oxygen and Symbicort.\par \par She uses nebulized albuterol/ipratropium.\par \par She remains on diuretic.\par \par She still has dyspnea and hypoxia on exertion.  She has been stable likely improved\par \par She has not been using NIV as it has been recalled by [employer].  She has tolerated this so far. \par She has contacted [employer]. \par \par She has some rhinitis\par \par \par She uses oxygen, walks well.\par \par Off oxygen, on room air her oxygen saturation drops to 88%.  I improves to 97% on oxygen 3 lpm\par \par She has an 8 mm lung nodule being followed with CT chest.  Stable on CT 4/2021.\par \par She has been stable.  She was concerned about low oxygen but she noted that O2 flow had been decreased.\par \par \par PMH\par \par HTN [TextBox_19] : resp failure

## 2021-12-17 NOTE — DISCUSSION/SUMMARY
[FreeTextEntry1] : COPD, emphysema interstitial lung disease\par \par hypercapnic chronic respiratory failure, with hypoxia\par \par CT has shown 8 mm right lung nodule\par \par \par She is not using NIV due to recall, for several months.  She has contacted [employer].\par \par PLAN\par \par Continue furosemide to 40 mg BID, spironolactone 25 mg per day\par \par Continue Symbicort 160/4.5 2 p twice per day with spacer\par \par Duoneb twice per day via nebulizer\par Restart on NIV at home when available fro [employer].  Will attempt to expedite for her.  i will call oxygen supplier.  \par \par She has had the influenza vaccine this season. in October\par \par She will receive COVID  vaccine.  \par \par check blood test, renal function electrolytes\par \par repeat CT chest to evaluate lung nodule.\par \par she has rhinitis, likely from O2 therapy\par \par Total time spent : 45 minutes\par Including:\par Preparation prior to visit - Reviewing prior record, results of tests and Consultation Reports as applicable\par Conducting an appropriate H & P during today's encounter\par \par reviewing all available CT chest exams.\par \par demonstrating images to pt as appropriate\par Counseling patient \par Note completion \par med renewal\par discussing differential diagnosis\par \par \par Angel Amador MD [facility]

## 2021-12-17 NOTE — REVIEW OF SYSTEMS
[Cough] : cough [Dyspnea] : dyspnea [Fever] : no fever [Nasal Congestion] : no nasal congestion [Chest Discomfort] : no chest discomfort [GERD] : no gerd

## 2022-01-18 ENCOUNTER — NON-APPOINTMENT (OUTPATIENT)
Age: 82
End: 2022-01-18

## 2022-01-20 ENCOUNTER — APPOINTMENT (OUTPATIENT)
Dept: OPHTHALMOLOGY | Facility: CLINIC | Age: 82
End: 2022-01-20

## 2022-02-22 ENCOUNTER — APPOINTMENT (OUTPATIENT)
Dept: OPHTHALMOLOGY | Facility: CLINIC | Age: 82
End: 2022-02-22

## 2022-03-30 ENCOUNTER — APPOINTMENT (OUTPATIENT)
Dept: PULMONOLOGY | Facility: CLINIC | Age: 82
End: 2022-03-30
Payer: MEDICARE

## 2022-03-30 VITALS
BODY MASS INDEX: 27.34 KG/M2 | HEART RATE: 88 BPM | WEIGHT: 140 LBS | TEMPERATURE: 96.7 F | OXYGEN SATURATION: 77 % | SYSTOLIC BLOOD PRESSURE: 136 MMHG | DIASTOLIC BLOOD PRESSURE: 60 MMHG

## 2022-03-30 PROCEDURE — 94060 EVALUATION OF WHEEZING: CPT

## 2022-03-30 PROCEDURE — 94727 GAS DIL/WSHOT DETER LNG VOL: CPT

## 2022-03-30 PROCEDURE — 99214 OFFICE O/P EST MOD 30 MIN: CPT

## 2022-03-30 PROCEDURE — 94729 DIFFUSING CAPACITY: CPT

## 2022-04-03 RX ORDER — DOXYCYCLINE HYCLATE 100 MG/1
100 TABLET ORAL TWICE DAILY
Qty: 10 | Refills: 0 | Status: DISCONTINUED | COMMUNITY
Start: 2022-01-18 | End: 2022-04-03

## 2022-04-04 NOTE — DISCUSSION/SUMMARY
[FreeTextEntry1] : COPD \par \par Chronic hypoxic hypercapnic respiratory failure\par \par chronic hypoxia\par \par \par She has received new NIV device and is using every night\par \par OAD:  Continue using Symbicort 2 puffs  times per day and Duoneb twice per day\par albuterol once per day\par \par Will consider nebulized long acting agents\par \par Resp failure:  using O2 at 3 lpm.  Room air O2 saturation 81% in office at rest\par \par Lung nodule:  Will repeat NC CT Chest\par \par \par Total time spent : 30 minutes\par Including:\par Preparation prior to visit - Reviewing prior record, results of tests and Consultation Reports as applicable\par Conducting an appropriate H & P during today's encounter\par Appropriate orders for tests, medications and procedures, as applicable\par Counseling patient \par Note completion\par \par Angel Amador MD

## 2022-04-04 NOTE — REASON FOR VISIT
[Follow-Up] : a follow-up visit [COPD] : COPD [TextBox_44] : CHF, hypercapnic hypoxic respiratory failure

## 2022-04-04 NOTE — PROCEDURE
[FreeTextEntry1] : PFT\par \par severe obstruction with air trapping\par \par diminished DLCO\par \par Angel Amador MD

## 2022-04-04 NOTE — HISTORY OF PRESENT ILLNESS
[TextBox_4] : 81 year old woman returns for follow up.\par \par She was hospitalized at [facility].  She was diagnosed with CHF, COPD/emphysema, hypoxic hypercapnic chronic respiratory failure.  She was started on BIPAP and was discharged on home NIV, furosemide, spironolactone and bronchodilators.  Lower extremity edema resolved.\par \par Echo demonstrated diastolic dysfunction,hyperdynamic LV. \par \par She has been on diuretic.  Edema resolved at this time.\par \par She is using portable oxygen and Symbicort.\par \par She uses nebulized albuterol/ipratropium.\par \par She remains on diuretic.\par \par She still has dyspnea and hypoxia on exertion.  She has been stable, status has improved\par \par She has obtained new  NIV and is using it every night.\par  \par She has some rhinitis\par \par \par She uses oxygen, walks well.\par \par Off oxygen, on room air her oxygen saturation drops to 70s .  It improves to 97% on oxygen 3 lpm\par \par She has an 8 mm lung nodule being followed with CT chest.  Stable on CT 4/2021.\par \par \par PMH\par \par HTN [TextBox_19] : resp failure

## 2022-06-13 NOTE — REASON FOR VISIT
Daily Note     Today's date: 2022  Patient name: Mario Evans  : 1955  MRN: [id number]  Referring provider: Shahana Meyer MD  Dx:   Encounter Diagnosis     ICD-10-CM    1  Chest wall pain following surgery  R07 89     G89 18                   Subjective: Patient reports that she feels her UE has loosened up  She states that she had some neck soreness which has resolved  Objective: See treatment diary below      Assessment: Tolerated treatment well  Patient would benefit from continued PT      Plan: Continue per plan of care        Precautions:  BCA    Manuals        IASTM             (-) pressure IASTM             PROM s/l ABD   8 8'  5'       pec minor st on towel roll   5          Neuro Re-Ed                          SBS nv 10 x :05 hep                       TB Row/ B ER  YTB x 10 YTBx20 YTB x 20 RTB x 20 RTB x 20                                              Ther Ex                          Pulleys nv 5' 5 5' 5 5'       Wall Slides flex/abd 3 x :20 5 x :10 10 x :10 10 x :10 10 x :10 dc       Pec doorway st 5 x :20 5 x :20 5x:20 5 x :20 5 x :20 5 x :20       Cane flex supine  10 x :10 10 x :10 10 x :10 10 x :10 10 x :10       Cane Hair cut  10 x :10 10 x :10 5 x :20 5 x :20                                  Ther Activity             Bicep curl    4# x 20 4# x 20 4# 2 x 10       Rows    4# x 20 4# x 20 4# 2 x 10       Tricep pushback    4# x 20 4# x 20 4# 2 x 10       scaption             Chest press             serratus      4# x 20 [Follow-Up] : a follow-up visit [COPD] : COPD [TextBox_44] : dyspnea

## 2022-07-05 ENCOUNTER — APPOINTMENT (OUTPATIENT)
Dept: PULMONOLOGY | Facility: CLINIC | Age: 82
End: 2022-07-05

## 2022-07-05 VITALS
WEIGHT: 149 LBS | DIASTOLIC BLOOD PRESSURE: 70 MMHG | SYSTOLIC BLOOD PRESSURE: 130 MMHG | OXYGEN SATURATION: 80 % | TEMPERATURE: 98.4 F | BODY MASS INDEX: 29.1 KG/M2 | HEART RATE: 77 BPM

## 2022-07-05 PROCEDURE — 99214 OFFICE O/P EST MOD 30 MIN: CPT

## 2022-07-05 NOTE — PHYSICAL EXAM
[No Acute Distress] : no acute distress [II] : Mallampati Class: II [Normal Appearance] : normal appearance [No Neck Mass] : no neck mass [No JVD] : no jvd [Normal Rate/Rhythm] : normal rate/rhythm [Normal S1, S2] : normal s1, s2 [No Murmurs] : no murmurs [Benign] : benign [No Masses] : no masses [No HSM] : no hsm [Normal Bowel Sounds] : normal bowel sounds [No Clubbing] : no clubbing [No Edema] : no edema [Oriented x3] : oriented x3 [TextBox_68] : diminished distant, unlabored  few basilar crackles

## 2022-07-05 NOTE — REVIEW OF SYSTEMS
[Fever] : no fever [Nasal Congestion] : no nasal congestion [Cough] : cough [Dyspnea] : dyspnea [Chest Discomfort] : no chest discomfort [GERD] : no gerd

## 2022-07-05 NOTE — HISTORY OF PRESENT ILLNESS
[TextBox_4] : 81 year old woman returns for follow up.\par \par She was hospitalized at [facility].  She was diagnosed with CHF, COPD/emphysema, hypoxic hypercapnic chronic respiratory failure.  She was started on BIPAP and was discharged on home NIV, furosemide, spironolactone and bronchodilators.  Lower extremity edema resolved.\par \par Echo demonstrated diastolic dysfunction,hyperdynamic LV. \par \par She has been on diuretic.  Edema resolved at this time.\par \par She is using portable oxygen and Symbicort.\par \par She uses nebulized albuterol/ipratropium.\par \par She remains on diuretic.\par \par She still has dyspnea and hypoxia on exertion.  She has been stable, status has improved\par \par She has obtained new  NIV and is using it every night.\par  \par She has some rhinitis\par \par \par She uses oxygen, walks well.\par \par Off oxygen, on room air her oxygen saturation drops to 70s .  It improves to 97% on oxygen 3 lpm\par \par She has an 8 mm lung nodule being followed with CT chest.  Stable on CT 4/2021.\par \par She has received NIV and is using it.\par \par \par Still with O2 desaturation on presentation, improves when on oxygen\par \par She had repeat CT chest 05/22  that did not demonstrate suspicious lesions\par \par She has no edema\par \par PMH\par \par HTN [TextBox_19] : resp failure

## 2022-07-05 NOTE — DISCUSSION/SUMMARY
[FreeTextEntry1] : COPD \par \par Chronic hypoxic hypercapnic respiratory failure\par \par chronic hypoxia\par \par \par She has received new NIV device and is using every night.  She states that she does not know how to attach humidifier\par \par I have contacted supplier who will arrange evaluation and assistance\par \par COPD:  Continue using Symbicort 2 puffs  times per day and Duoneb twice per day\par albuterol once per day\par \par Will consider nebulized lmeds\par \par Resp failure:  using O2 at 3 lpm.  Room air O2 saturation 81% in office at rest\par \par Lung nodule:   NC CT Chest  may 2022 was without suspicious nodule.  There is severe emphysema\par \par Have recommended she see cardiologist regarding pulm HTN\par \par Follow CT chest as needed\par \par \par Total time spent : 30 minutes\par Including:\par Preparation prior to visit - Reviewing prior record, results of tests and Consultation Reports as applicable\par Conducting an appropriate H & P during today's encounter\par Appropriate orders for tests, medications and procedures, as applicable\par Counseling patient \par Note completion\par \par Angel Amador MD

## 2022-07-05 NOTE — REASON FOR VISIT
[Follow-Up] : a follow-up visit [COPD] : COPD [Emphysema] : emphysema [Pulmonary Hypertension] : pulmonary hypertension [Shortness of Breath] : shortness of breath

## 2022-08-03 ENCOUNTER — APPOINTMENT (OUTPATIENT)
Dept: PULMONOLOGY | Facility: CLINIC | Age: 82
End: 2022-08-03

## 2022-08-03 VITALS
BODY MASS INDEX: 29.1 KG/M2 | SYSTOLIC BLOOD PRESSURE: 128 MMHG | HEART RATE: 88 BPM | DIASTOLIC BLOOD PRESSURE: 70 MMHG | OXYGEN SATURATION: 71 % | TEMPERATURE: 98.4 F | WEIGHT: 149 LBS

## 2022-08-03 VITALS — HEART RATE: 78 BPM | OXYGEN SATURATION: 88 %

## 2022-08-03 PROCEDURE — 99214 OFFICE O/P EST MOD 30 MIN: CPT

## 2022-08-04 NOTE — REASON FOR VISIT
[Follow-Up] : a follow-up visit [Pulmonary Hypertension] : pulmonary hypertension [COPD] : COPD [TextBox_44] : respiratory failure, hypoxia

## 2022-08-04 NOTE — HISTORY OF PRESENT ILLNESS
[TextBox_4] : 81 year old woman returns for follow up.\par \par She was hospitalized at [facility].  She was diagnosed with CHF, COPD/emphysema, hypoxic hypercapnic chronic respiratory failure.  She was started on BIPAP and was discharged on home NIV, furosemide, spironolactone and bronchodilators.  Lower extremity edema resolved.\par \par Echo demonstrated diastolic dysfunction,hyperdynamic LV. \par \par She has been on diuretic.  Edema resolved at this time.\par \par She is using portable oxygen and Symbicort and nebulized albuterol/ipratropium.\par \par \par \par She still has dyspnea and hypoxia on exertion.  She has been stable, status has improved\par \par She has obtained new  NIV and is using it every night.\par \par Off oxygen, on room air her oxygen saturation drops to 70s .  It improves to 97% on oxygen 3 lpm\par \par She has an 8 mm lung nodule being followed with CT chest.  Stable on CT 4/2021.\par \par \par She had repeat CT chest 05/22  that did not demonstrate suspicious lesions\par \par She has no edema\par \par She is able to ambulate better\par \par PMH\par \par HTN [TextBox_19] : resp failure

## 2022-08-04 NOTE — DISCUSSION/SUMMARY
[FreeTextEntry1] : COPD \par \par Chronic hypoxic hypercapnic respiratory failure\par \par She has received new NIV device and is using every night.  She states that she does not know how to attach humidifier\par \par I have contacted supplier who will arrange evaluation and assistance\par \par \par using O2 at 3 lpm in day\par \par COPD:  Continue using Symbicort 2 puffs  times per day and Duoneb twice per day\par albuterol once per day\par  \par \par Lung nodule:   NC CT Chest  may 2022 was without suspicious nodule.  There is severe emphysema\par \par Have recommended she see cardiologist regarding pulmonary HTN\par \par Follow CT chest as needed\par \par \par Total time spent : 30 minutes\par Including:\par Preparation prior to visit - Reviewing prior record, results of tests and Consultation Reports as applicable\par Conducting an appropriate H & P during today's encounter\par Appropriate orders for tests, medications and procedures, as applicable\par Counseling patient \par Note completion\par \par Angel Amador MD

## 2022-08-04 NOTE — HISTORY OF PRESENT ILLNESS
[TextBox_4] : 81 year old woman returns for follow up.\par \par She was hospitalized at [facility].  She was diagnosed with CHF, COPD/emphysema, hypoxic hypercapnic chronic respiratory failure.  She was started on BIPAP and was discharged on home NIV, furosemide, spironolactone and bronchodilators.  Lower extremity edema resolved.\par \par Echo demonstrated diastolic dysfunction,hyperdynamic LV. \par \par She has been on diuretic.  Edema resolved at this time.\par \par She is using portable oxygen and Symbicort and nebulized albuterol/ipratropium.\par \par \par \par She still has dyspnea and hypoxia on exertion.  She has been stable, status has improved\par \par She has obtained new  NIV and is using it every night.\par \par Off oxygen, on room air her oxygen saturation drops to 70s .  It improves to 97% on oxygen 3 lpm\par \par She has an 8 mm lung nodule being followed with CT chest.  Stable on CT 4/2021.\par \par \par She had repeat CT chest 05/22  that did not demonstrate suspicious lesions\par \par She has no edema\par \par She is able to ambulate better\par \par PMH\par \par HTN [TextBox_19] : resp failure

## 2022-08-04 NOTE — PHYSICAL EXAM
[No Acute Distress] : no acute distress [II] : Mallampati Class: II [Normal Appearance] : normal appearance [No Neck Mass] : no neck mass [No JVD] : no jvd [Normal Rate/Rhythm] : normal rate/rhythm [Normal S1, S2] : normal s1, s2 [No Murmurs] : no murmurs [Benign] : benign [No Masses] : no masses [No HSM] : no hsm [Normal Bowel Sounds] : normal bowel sounds [No Clubbing] : no clubbing [No Edema] : no edema [Oriented x3] : oriented x3 [TextBox_68] : diminished distant, unlabored  few basilar crackles stable no wheeze

## 2022-11-08 ENCOUNTER — APPOINTMENT (OUTPATIENT)
Dept: PULMONOLOGY | Facility: CLINIC | Age: 82
End: 2022-11-08

## 2022-11-08 VITALS
OXYGEN SATURATION: 93 % | RESPIRATION RATE: 20 BRPM | HEART RATE: 85 BPM | DIASTOLIC BLOOD PRESSURE: 72 MMHG | SYSTOLIC BLOOD PRESSURE: 142 MMHG | TEMPERATURE: 97 F

## 2022-11-08 PROCEDURE — 99214 OFFICE O/P EST MOD 30 MIN: CPT

## 2022-11-08 NOTE — HISTORY OF PRESENT ILLNESS
[< 20 pack-years] : < 20 pack-years [Never] : never [TextBox_4] : 81 year old woman returns for follow up.\par \par She was hospitalized at [facility].  She was diagnosed with CHF, COPD/emphysema, hypoxic hypercapnic chronic respiratory failure.  She was started on BIPAP and was discharged on home NIV, furosemide, spironolactone and bronchodilators.  Lower extremity edema resolved.\par \par Echo demonstrated diastolic dysfunction,hyperdynamic LV.  Dilated right ventricle\par \par She has been on diuretic furosemide and spironolactone.  Edema remains resolved at this time.\par \par She is using portable oxygen 3 L/min.\par \par She uses nebulized albuterol/ipratropium.  And Symbicort 2 puffs twice per day\par \par \par She remains on diuretic.\par \par She still has dyspnea and hypoxia on exertion.  She has been stable, respiratory status has improved\par \par She has obtained new  NIV and is using it every night.\par  \par She has some rhinitis\par \par \par She uses oxygen, walks well.\par \par Off oxygen, on room air her oxygen saturation drops to 70s .  It improves to 97% on oxygen 3 lpm\par \par She has an 8 mm lung nodule being followed with CT chest.  Stable on CT 4/2021.\par \par \par \par She had repeat CT chest 05/22  that did not demonstrate suspicious lesions\par \par \par O2 saturation was 93% on 3 L oxygen on presentation today\par \par She has had no exacerbations she has had evaluation by cardiology, note reviewed\par \par PMH\par \par HTN [TextBox_11] : half [TextBox_13] : 20 [YearQuit] : 2002 [Snoring] : no snoring [TextBox_19] : Chronic hypercapnic respiratory failure

## 2022-11-08 NOTE — DISCUSSION/SUMMARY
[FreeTextEntry1] : COPD emphysema on CT chest\par \par Chronic hypoxic hypercapnic respiratory failure and chronic hypoxia\par \par She is using NIV at night and uses oxygen in the day 3 L/min\par \par \par She has received new NIV device and is using every night.  She is not using humidity\par \par \par COPD:  Continue using Symbicort 2 puffs  times per day and Duoneb twice per day\par albuterol once per day\par \par \par Lung nodule:   NC CT Chest  May 2022 was without suspicious nodule.  There is severe emphysema\par \par \par \par The patient has had the influenza vaccine this season. \par \par She has had evaluation by Dr Lobo Morales and states she had an echo will need results, as prior echo demonstrated  pulmonary HTN\par \par \par She will undergo follow-up CT chest as needed\par \par \par Total time spent : 30 minutes\par Including:\par Preparation prior to visit - Reviewing prior record, results of tests and Consultation Reports as applicable\par Conducting an appropriate H & P during today's encounter\par Appropriate orders for tests, medications and procedures, as applicable\par Counseling patient \par Note completion\par \par Angel Amador MD

## 2023-01-04 LAB
A1AT SERPL-MCNC: 175 MG/DL
ALBUMIN SERPL ELPH-MCNC: 4.7 G/DL
ALP BLD-CCNC: 133 U/L
ALT SERPL-CCNC: 8 U/L
ANION GAP SERPL CALC-SCNC: 16 MMOL/L
AST SERPL-CCNC: 16 U/L
BASOPHILS # BLD AUTO: 0.07 K/UL
BASOPHILS NFR BLD AUTO: 0.9 %
BILIRUB SERPL-MCNC: 0.3 MG/DL
BUN SERPL-MCNC: 32 MG/DL
CALCIUM SERPL-MCNC: 10.1 MG/DL
CHLORIDE SERPL-SCNC: 95 MMOL/L
CO2 SERPL-SCNC: 29 MMOL/L
CREAT SERPL-MCNC: 1.4 MG/DL
EGFR: 37 ML/MIN/1.73M2
EOSINOPHIL # BLD AUTO: 0.28 K/UL
EOSINOPHIL NFR BLD AUTO: 3.6 %
GLUCOSE SERPL-MCNC: 86 MG/DL
HCT VFR BLD CALC: 41 %
HGB BLD-MCNC: 12.5 G/DL
IMM GRANULOCYTES NFR BLD AUTO: 0.4 %
LYMPHOCYTES # BLD AUTO: 2.18 K/UL
LYMPHOCYTES NFR BLD AUTO: 28.3 %
MAN DIFF?: NORMAL
MCHC RBC-ENTMCNC: 28.2 PG
MCHC RBC-ENTMCNC: 30.5 GM/DL
MCV RBC AUTO: 92.3 FL
MONOCYTES # BLD AUTO: 0.8 K/UL
MONOCYTES NFR BLD AUTO: 10.4 %
NEUTROPHILS # BLD AUTO: 4.33 K/UL
NEUTROPHILS NFR BLD AUTO: 56.4 %
NT-PROBNP SERPL-MCNC: 258 PG/ML
PLATELET # BLD AUTO: 232 K/UL
POTASSIUM SERPL-SCNC: 4.5 MMOL/L
PROT SERPL-MCNC: 7.2 G/DL
RBC # BLD: 4.44 M/UL
RBC # FLD: 14.1 %
SODIUM SERPL-SCNC: 140 MMOL/L
WBC # FLD AUTO: 7.69 K/UL

## 2023-01-09 LAB
A1AT PHENOTYP SERPL-IMP: NORMAL
A1AT SERPL-MCNC: 175 MG/DL

## 2023-03-07 ENCOUNTER — APPOINTMENT (OUTPATIENT)
Dept: PULMONOLOGY | Facility: CLINIC | Age: 83
End: 2023-03-07
Payer: MEDICARE

## 2023-03-07 VITALS
SYSTOLIC BLOOD PRESSURE: 128 MMHG | BODY MASS INDEX: 29.3 KG/M2 | HEART RATE: 82 BPM | OXYGEN SATURATION: 89 % | WEIGHT: 150 LBS | DIASTOLIC BLOOD PRESSURE: 58 MMHG | TEMPERATURE: 97.7 F

## 2023-03-07 PROCEDURE — 94060 EVALUATION OF WHEEZING: CPT

## 2023-03-07 PROCEDURE — 99215 OFFICE O/P EST HI 40 MIN: CPT | Mod: 25

## 2023-03-07 PROCEDURE — 94729 DIFFUSING CAPACITY: CPT

## 2023-03-07 PROCEDURE — 94727 GAS DIL/WSHOT DETER LNG VOL: CPT

## 2023-03-07 NOTE — PROCEDURE
[FreeTextEntry1] : PFT demonstrates severe obstructive defect with FEV1 0.75 L 56% of predicted FVC 1.39, 67% predicted and FEV1/FVC ratio 54%\par Normal lung volumes, there is elevated RV suggesting hyperinflation, DLCO is diminished\par \par

## 2023-03-07 NOTE — HISTORY OF PRESENT ILLNESS
[Former] : former [< 20 pack-years] : < 20 pack-years [Never] : never [TextBox_4] : 82 year old woman returns for follow up.\par \par She was hospitalized at [facility].  She was diagnosed with CHF, COPD/emphysema, hypoxic hypercapnic chronic respiratory failure.  She was started on BIPAP and was discharged on home NIV, furosemide, spironolactone and bronchodilators.  Lower extremity edema resolved.\par \par Echo demonstrated diastolic dysfunction,hyperdynamic LV.  Dilated right ventricle\par \par She has been on diuretic furosemide and spironolactone.  Edema remains resolved at this time.\par \par She is using portable oxygen 3 L/min.\par \par She uses nebulized albuterol/ipratropium. and Symbicort 2 puffs twice per day\par \par \par She remains on diuretic.\par \par She still has dyspnea and hypoxia on exertion.  She has been stable, respiratory status has improved\par \par She has obtained new  NIV and is using it every night.\par  \par She has some rhinitis\par \par \par She uses oxygen, walks well.\par \par Off oxygen, on room air her oxygen saturation drops to 70s .  It improves to 97% on oxygen 3 lpm\par \par She has an 8 mm lung nodule being followed with CT chest.  Stable on CT 4/2021.\par \par \par She had repeat CT chest 05/22  that did not demonstrate suspicious lesions\par \par \par O2 saturation was 93% on 3 L oxygen on presentation today\par \par \par She has had dyspnea on exertion , increased symptoms recently\par \par She is using DuoNeb once per day and Symbicort MDI 2 puffs twice per day.  She remains on oxygen as before\par \par She has no pedal edema chest pain cough or sputum production\par \par PMH\par \par HTN [TextBox_11] : 0.5 [TextBox_13] : Spin 20 [YearQuit] : 1997 [Snoring] : no snoring [TextBox_19] : Chronic hypercapnic respiratory failure

## 2023-03-07 NOTE — DISCUSSION/SUMMARY
[FreeTextEntry1] : 82-year-old woman with severe COPD, severely diminished FEV1 less than 1 L, emphysema on CT scan of the chest, pulmonary hypertension and chronic hypercapnic respiratory failure\par \par COPD emphysema on CT chest\par \par Chronic hypoxic hypercapnic respiratory failure and chronic hypoxia\par \par She is using NIV at night and uses oxygen in the day 3 L/min\par \par \par She has received new NIV device and is using every night.  She has obtained humidification attachment\par \par \par COPD: Remains symptomatic.  I suspect she is not gaining adequate benefit from metered-dose inhaler device and short acting nebulized bronchodilators\par \par I will order long-acting bronchodilators formoterol fumarate and Yupelri.\par \par Prescriptions placed with direct Rx and authorization forms completed and faxed by me\par Lung nodule:   NC CT Chest  May 2022 was without suspicious nodule.  There is severe emphysema\par \par \par \par She has had evaluation by Dr Lobo Morales and states she had an echo will need results, as prior echo demonstrated  pulmonary HTN\par \par \par She will undergo follow-up CT chest as needed\par \par \par Total time spent : 45 minutes\par Including:\par Preparation prior to visit - Reviewing prior record, results of tests and Consultation Reports as applicable\par Conducting an appropriate H & P during today's encounter\par Appropriate orders for tests, medications and procedures, as applicable\par Counseling patient \par Note completion\par \par Angel Amador MD

## 2023-03-07 NOTE — REASON FOR VISIT
[Follow-Up] : a follow-up visit [COPD] : COPD [Emphysema] : emphysema [Pulmonary Hypertension] : pulmonary hypertension [Shortness of Breath] : shortness of breath [TextBox_44] : Hypercapnic respiratory failure

## 2023-03-14 ENCOUNTER — NON-APPOINTMENT (OUTPATIENT)
Age: 83
End: 2023-03-14

## 2023-03-28 ENCOUNTER — NON-APPOINTMENT (OUTPATIENT)
Age: 83
End: 2023-03-28

## 2023-03-31 DIAGNOSIS — K59.00 CONSTIPATION, UNSPECIFIED: ICD-10-CM

## 2023-03-31 RX ORDER — POLYETHYLENE GLYCOL 3350 17 G/17G
17 POWDER, FOR SOLUTION ORAL DAILY
Qty: 5 | Refills: 0 | Status: ACTIVE | COMMUNITY
Start: 2023-03-31 | End: 1900-01-01

## 2023-04-14 LAB
ALBUMIN SERPL ELPH-MCNC: 4.7 G/DL
ALP BLD-CCNC: 125 U/L
ALT SERPL-CCNC: 11 U/L
ANION GAP SERPL CALC-SCNC: 17 MMOL/L
AST SERPL-CCNC: 16 U/L
BASOPHILS # BLD AUTO: 0.05 K/UL
BASOPHILS NFR BLD AUTO: 0.9 %
BILIRUB SERPL-MCNC: 0.4 MG/DL
BUN SERPL-MCNC: 28 MG/DL
CALCIUM SERPL-MCNC: 10.2 MG/DL
CHLORIDE SERPL-SCNC: 94 MMOL/L
CO2 SERPL-SCNC: 29 MMOL/L
CREAT SERPL-MCNC: 1.5 MG/DL
EGFR: 34 ML/MIN/1.73M2
EOSINOPHIL # BLD AUTO: 0.2 K/UL
EOSINOPHIL NFR BLD AUTO: 3.7 %
GLUCOSE SERPL-MCNC: 92 MG/DL
HCT VFR BLD CALC: 40.8 %
HGB BLD-MCNC: 12.6 G/DL
IMM GRANULOCYTES NFR BLD AUTO: 0.2 %
LYMPHOCYTES # BLD AUTO: 1.3 K/UL
LYMPHOCYTES NFR BLD AUTO: 24.1 %
MAN DIFF?: NORMAL
MCHC RBC-ENTMCNC: 27.8 PG
MCHC RBC-ENTMCNC: 30.9 GM/DL
MCV RBC AUTO: 90.1 FL
MONOCYTES # BLD AUTO: 0.5 K/UL
MONOCYTES NFR BLD AUTO: 9.3 %
NEUTROPHILS # BLD AUTO: 3.33 K/UL
NEUTROPHILS NFR BLD AUTO: 61.8 %
PLATELET # BLD AUTO: 226 K/UL
POTASSIUM SERPL-SCNC: 4.4 MMOL/L
PROT SERPL-MCNC: 7.2 G/DL
RBC # BLD: 4.53 M/UL
RBC # FLD: 14 %
SODIUM SERPL-SCNC: 140 MMOL/L
WBC # FLD AUTO: 5.39 K/UL

## 2023-05-22 RX ORDER — INHALER, ASSIST DEVICES
SPACER (EA) MISCELLANEOUS
Qty: 1 | Refills: 2 | Status: ACTIVE | COMMUNITY
Start: 2023-05-22 | End: 1900-01-01

## 2023-05-22 RX ORDER — BUDESONIDE AND FORMOTEROL FUMARATE DIHYDRATE 160; 4.5 UG/1; UG/1
160-4.5 AEROSOL RESPIRATORY (INHALATION) TWICE DAILY
Qty: 1 | Refills: 0 | Status: DISCONTINUED | COMMUNITY
Start: 2021-01-13 | End: 2023-05-22

## 2023-06-06 ENCOUNTER — APPOINTMENT (OUTPATIENT)
Dept: PULMONOLOGY | Facility: CLINIC | Age: 83
End: 2023-06-06
Payer: MEDICARE

## 2023-06-06 VITALS
HEART RATE: 87 BPM | SYSTOLIC BLOOD PRESSURE: 120 MMHG | BODY MASS INDEX: 30.08 KG/M2 | TEMPERATURE: 98.7 F | OXYGEN SATURATION: 97 % | WEIGHT: 154 LBS | DIASTOLIC BLOOD PRESSURE: 68 MMHG

## 2023-06-06 PROCEDURE — 99214 OFFICE O/P EST MOD 30 MIN: CPT

## 2023-06-06 RX ORDER — ARFORMOTEROL TARTRATE INHALATION SOLUTION 15 UG/2ML
15 SOLUTION RESPIRATORY (INHALATION)
Qty: 3 | Refills: 1 | Status: DISCONTINUED | COMMUNITY
Start: 2023-03-21 | End: 2023-06-06

## 2023-06-06 RX ORDER — DOXYCYCLINE HYCLATE 100 MG/1
100 TABLET ORAL TWICE DAILY
Qty: 10 | Refills: 0 | Status: DISCONTINUED | COMMUNITY
Start: 2023-03-17 | End: 2023-06-06

## 2023-06-06 RX ORDER — BUDESONIDE, GLYCOPYRROLATE, AND FORMOTEROL FUMARATE 160; 9; 4.8 UG/1; UG/1; UG/1
160-9-4.8 AEROSOL, METERED RESPIRATORY (INHALATION) TWICE DAILY
Qty: 3 | Refills: 3 | Status: DISCONTINUED | COMMUNITY
Start: 2023-05-22 | End: 2023-06-06

## 2023-06-06 RX ORDER — PREDNISONE 20 MG/1
20 TABLET ORAL DAILY
Qty: 5 | Refills: 0 | Status: DISCONTINUED | COMMUNITY
Start: 2023-03-17 | End: 2023-06-06

## 2023-06-06 NOTE — REVIEW OF SYSTEMS
[facility] # [id number]   [Cough] : cough [Dyspnea] : dyspnea [Fever] : no fever [Nasal Congestion] : no nasal congestion [Chest Discomfort] : no chest discomfort [GERD] : no gerd

## 2023-06-06 NOTE — PHYSICAL EXAM
[No Acute Distress] : no acute distress [II] : Mallampati Class: II [Normal Appearance] : normal appearance [No Neck Mass] : no neck mass [No JVD] : no jvd [Normal Rate/Rhythm] : normal rate/rhythm [Normal S1, S2] : normal s1, s2 [No Murmurs] : no murmurs [Wheeze] : wheeze [Benign] : benign [No Masses] : no masses [No HSM] : no hsm [Normal Bowel Sounds] : normal bowel sounds [No Clubbing] : no clubbing [No Edema] : no edema [Oriented x3] : oriented x3 [TextBox_68] : diminished distant, wheeze noted bilaterally on expiration

## 2023-06-06 NOTE — DISCUSSION/SUMMARY
[FreeTextEntry1] : 82-year-old woman with severe COPD, severely diminished FEV1 less than 1 L, emphysema on CT scan of the chest, pulmonary hypertension and chronic hypercapnic respiratory failure\par \par COPD emphysema on CT chest\par \par Chronic hypoxic hypercapnic respiratory failure and chronic hypoxia\par \par She is using NIV at night and uses oxygen in the day 3 L/min\par \par \par She has received new NIV device and is using every night.  She has obtained humidification attachment\par \par \par COPD: Remains symptomatic.  I suspect she is not gaining adequate benefit from metered-dose inhaler device and short acting nebulized bronchodilators\par \par I ordered long-acting bronchodilators formoterol fumarate and Yupelri.\par \par She did use them but did not note a significant benefit.  the cost was high as well\par \par she will now use Symbicort 160/4.5 and Duoneb both BID\par \par Will treat with prednisone 20 mg for 7 days to assess effect\par \par Will reassess cardiac function\par \par She has had evaluation by Dr Lobo Morales and states she had an echo will need results, as prior echo demonstrated  pulmonary HTN\par \par \par She will undergo follow-up CT chest as needed\par \par \par Total time spent : 30 minutes\par Including:\par Preparation prior to visit - Reviewing prior record, results of tests and Consultation Reports as applicable\par Conducting an appropriate H & P during today's encounter\par Appropriate orders for tests, medications and procedures, as applicable\par Counseling patient \par Note completion\par \par Angel Amador MD

## 2023-06-06 NOTE — HISTORY OF PRESENT ILLNESS
[Former] : former [< 20 pack-years] : < 20 pack-years [Never] : never [TextBox_4] : 82 year old woman returns for follow up.\par \par She was hospitalized at [facility].  She was diagnosed with CHF, COPD/emphysema, hypoxic hypercapnic chronic respiratory failure.  She was started on BIPAP and was discharged on home NIV, furosemide, spironolactone and bronchodilators.  Lower extremity edema resolved.\par \par Echo demonstrated diastolic dysfunction,hyperdynamic LV.  Dilated right ventricle\par \par She has been on diuretic furosemide and spironolactone.  Edema remains resolved at this time.\par \par She is using portable oxygen 3 L/min.\par \par She uses nebulized albuterol/ipratropium. and Symbicort 2 puffs twice per day\par \par \par She remains on diuretic.\par \par She still has dyspnea and hypoxia on exertion.  She has been stable, respiratory status has improved\par \par She has obtained new  NIV and is using it every night.\par  \par She has some rhinitis\par \par \par She uses oxygen, walks well.\par \par Off oxygen, on room air her oxygen saturation drops to 70s .  It improves to 97% on oxygen 3 lpm\par \par She has an 8 mm lung nodule being followed with CT chest.  Stable on CT 4/2021.\par \par \par She had repeat CT chest 05/22  that did not demonstrate suspicious lesions\par \par \par O2 saturation was 93% on 3 L oxygen on presentation today\par \par \par She has had dyspnea on exertion , increased symptoms recently\par \par She is using DuoNeb once per day and Symbicort MDI 2 puffs twice per day.  She remains on oxygen as before\par \par She has no pedal edema chest pain cough or sputum production\par \par PMH\par \par HTN [TextBox_11] : 0.5 [TextBox_13] : Spin 20 [YearQuit] : 1997 [Snoring] : no snoring [TextBox_19] : Chronic hypercapnic respiratory failure

## 2023-07-18 ENCOUNTER — APPOINTMENT (OUTPATIENT)
Dept: PULMONOLOGY | Facility: CLINIC | Age: 83
End: 2023-07-18
Payer: MEDICARE

## 2023-07-18 VITALS
BODY MASS INDEX: 28.9 KG/M2 | OXYGEN SATURATION: 87 % | DIASTOLIC BLOOD PRESSURE: 62 MMHG | WEIGHT: 148 LBS | SYSTOLIC BLOOD PRESSURE: 124 MMHG | TEMPERATURE: 98.4 F | HEART RATE: 87 BPM

## 2023-07-18 DIAGNOSIS — R91.1 SOLITARY PULMONARY NODULE: ICD-10-CM

## 2023-07-18 DIAGNOSIS — Z99.81 DEPENDENCE ON SUPPLEMENTAL OXYGEN: ICD-10-CM

## 2023-07-18 DIAGNOSIS — I27.20 PULMONARY HYPERTENSION, UNSPECIFIED: ICD-10-CM

## 2023-07-18 PROCEDURE — 99214 OFFICE O/P EST MOD 30 MIN: CPT

## 2023-07-18 RX ORDER — ALBUTEROL SULFATE 90 UG/1
108 (90 BASE) INHALANT RESPIRATORY (INHALATION)
Qty: 3 | Refills: 2 | Status: ACTIVE | COMMUNITY
Start: 2017-02-14 | End: 1900-01-01

## 2023-07-18 RX ORDER — BUDESONIDE AND FORMOTEROL FUMARATE DIHYDRATE 160; 4.5 UG/1; UG/1
160-4.5 AEROSOL RESPIRATORY (INHALATION) TWICE DAILY
Qty: 3 | Refills: 2 | Status: ACTIVE | COMMUNITY
Start: 2023-07-18 | End: 1900-01-01

## 2023-07-18 NOTE — HISTORY OF PRESENT ILLNESS
[Former] : former [< 20 pack-years] : < 20 pack-years [Never] : never [TextBox_4] : 82 year old woman returns for follow up.\par \par She was hospitalized at [facility].  She was diagnosed with CHF, COPD/emphysema, hypoxic hypercapnic chronic respiratory failure.  She was started on BIPAP and was discharged on home NIV, furosemide, spironolactone and bronchodilators.  Lower extremity edema resolved.\par \par Echo demonstrated diastolic dysfunction,hyperdynamic LV.  Dilated right ventricle\par \par She has been on diuretic furosemide and spironolactone.  Edema remains resolved at this time.\par \par She is using portable oxygen 3 L/min.\par \par She uses nebulized albuterol/ipratropium. and Symbicort 2 puffs twice per day\par \par \par She remains on diuretic.\par \par She still has dyspnea and hypoxia on exertion.  She has been stable, respiratory status has improved\par \par She has obtained new  NIV and is using it every night.\par  \par She has some rhinitis\par \par \par She uses oxygen, walks well.\par \par Off oxygen, on room air her oxygen saturation drops to 70s .  It improves to 97% on oxygen 3 lpm\par \par She has an 8 mm lung nodule being followed with CT chest.  Stable on CT 4/2021.\par \par \par She had repeat CT chest 05/22  that did not demonstrate suspicious lesions\par \par \par She has had dyspnea on exertion \par \par She is using DuoNeb once per day and Symbicort MDI 2 puffs twice per day.  She remains on oxygen as before\par \par She has no pedal edema chest pain cough or sputum production\par \par PMH\par \par HTN [TextBox_11] : 0.5 [TextBox_13] : Spin 20 [YearQuit] : 1997 [Snoring] : no snoring [TextBox_19] : Chronic hypercapnic respiratory failure

## 2023-07-18 NOTE — PHYSICAL EXAM
[No Acute Distress] : no acute distress [II] : Mallampati Class: II [Normal Appearance] : normal appearance [No Neck Mass] : no neck mass [No JVD] : no jvd [Normal Rate/Rhythm] : normal rate/rhythm [Normal S1, S2] : normal s1, s2 [No Murmurs] : no murmurs [Wheeze] : wheeze [Benign] : benign [No Masses] : no masses [No HSM] : no hsm [Normal Bowel Sounds] : normal bowel sounds [No Clubbing] : no clubbing [No Edema] : no edema [Oriented x3] : oriented x3 [TextBox_68] : diminished distant,  no wheeze noted today

## 2023-07-18 NOTE — DISCUSSION/SUMMARY
[FreeTextEntry1] : 82-year-old woman with severe COPD, severely diminished FEV1 less than 1 L, emphysema on CT scan of the chest, pulmonary hypertension and chronic hypercapnic respiratory failure\par \par COPD emphysema on CT chest\par \par Chronic hypoxic hypercapnic respiratory failure and chronic hypoxia\par \par She is using NIV at night and uses oxygen in the day 3 L/min\par \par \par She has received new NIV device and is using every night.  She has obtained humidification attachment\par \par \par COPD: Remains symptomatic.  I suspect she is not gaining adequate benefit from metered-dose inhaler device and short acting nebulized bronchodilators\par \par I ordered long-acting bronchodilators formoterol fumarate and Yupelri.\par \par She did use them but did not note a significant benefit.  the cost was high as well\par \par she has been using Symbicort 160/4.5 and Duoneb both BID\par \par At time of prior visit she was  treated with prednisone 20 mg for 7 days to assess effect, with some benefit\par Her breathing is at baseline, with O2 desaturation with exertion and limited tolerance\par \par She has had evaluation by Dr Lobo Morales  prior echo demonstrated  pulmonary HTN\par \par \par She has edema of her ankles today\par \par She will undergo follow-up CT chest as needed\par \par continue current regimen\par \par \par Total time spent : 30 minutes\par Including:\par Preparation prior to visit - Reviewing prior record, results of tests and Consultation Reports as applicable\par Conducting an appropriate H & P during today's encounter\par Appropriate orders for tests, medications and procedures, as applicable\par Counseling patient \par Note completion\par \par Angel Amador MD

## 2023-10-03 DIAGNOSIS — I10 ESSENTIAL (PRIMARY) HYPERTENSION: ICD-10-CM

## 2023-10-03 RX ORDER — FUROSEMIDE 40 MG/1
40 TABLET ORAL DAILY
Qty: 30 | Refills: 2 | Status: ACTIVE | COMMUNITY
Start: 2023-06-06

## 2023-10-03 RX ORDER — FUROSEMIDE 40 MG/1
40 TABLET ORAL
Qty: 20 | Refills: 0 | Status: DISCONTINUED | COMMUNITY
Start: 2023-07-18 | End: 2023-10-03

## 2023-10-03 RX ORDER — PREDNISONE 20 MG/1
20 TABLET ORAL DAILY
Qty: 7 | Refills: 0 | Status: DISCONTINUED | COMMUNITY
Start: 2023-06-06 | End: 2023-10-03

## 2023-10-17 ENCOUNTER — APPOINTMENT (OUTPATIENT)
Dept: PULMONOLOGY | Facility: CLINIC | Age: 83
End: 2023-10-17

## 2023-11-01 ENCOUNTER — NON-APPOINTMENT (OUTPATIENT)
Age: 83
End: 2023-11-01

## 2023-11-01 ENCOUNTER — APPOINTMENT (OUTPATIENT)
Dept: PULMONOLOGY | Facility: CLINIC | Age: 83
End: 2023-11-01
Payer: MEDICARE

## 2023-11-01 VITALS — TEMPERATURE: 97.5 F | OXYGEN SATURATION: 79 % | BODY MASS INDEX: 25.78 KG/M2 | HEART RATE: 61 BPM | WEIGHT: 132 LBS

## 2023-11-01 VITALS — OXYGEN SATURATION: 93 %

## 2023-11-01 DIAGNOSIS — I49.9 CARDIAC ARRHYTHMIA, UNSPECIFIED: ICD-10-CM

## 2023-11-01 DIAGNOSIS — N18.9 CHRONIC KIDNEY DISEASE, UNSPECIFIED: ICD-10-CM

## 2023-11-01 DIAGNOSIS — J44.9 CHRONIC OBSTRUCTIVE PULMONARY DISEASE, UNSPECIFIED: ICD-10-CM

## 2023-11-01 DIAGNOSIS — J96.92 RESPIRATORY FAILURE, UNSPECIFIED WITH HYPERCAPNIA: ICD-10-CM

## 2023-11-01 DIAGNOSIS — R09.02 HYPOXEMIA: ICD-10-CM

## 2023-11-01 PROCEDURE — 93000 ELECTROCARDIOGRAM COMPLETE: CPT

## 2023-11-01 PROCEDURE — 99213 OFFICE O/P EST LOW 20 MIN: CPT | Mod: 25

## 2024-04-18 ENCOUNTER — TRANSCRIPTION ENCOUNTER (OUTPATIENT)
Age: 84
End: 2024-04-18

## 2024-04-18 RX ORDER — SPIRONOLACTONE 25 MG/1
25 TABLET ORAL DAILY
Qty: 90 | Refills: 1 | Status: ACTIVE | COMMUNITY
Start: 2021-02-03 | End: 1900-01-01

## 2024-05-06 RX ORDER — IPRATROPIUM BROMIDE AND ALBUTEROL SULFATE 2.5; .5 MG/3ML; MG/3ML
0.5-2.5 (3) SOLUTION RESPIRATORY (INHALATION) TWICE DAILY
Qty: 2 | Refills: 3 | Status: ACTIVE | COMMUNITY
Start: 2020-11-11 | End: 1900-01-01

## 2024-09-30 ENCOUNTER — APPOINTMENT (OUTPATIENT)
Dept: PULMONOLOGY | Facility: CLINIC | Age: 84
End: 2024-09-30
Payer: MEDICARE

## 2024-09-30 VITALS
OXYGEN SATURATION: 79 % | BODY MASS INDEX: 27.15 KG/M2 | WEIGHT: 139 LBS | SYSTOLIC BLOOD PRESSURE: 114 MMHG | HEART RATE: 92 BPM | DIASTOLIC BLOOD PRESSURE: 60 MMHG | TEMPERATURE: 97.1 F

## 2024-09-30 VITALS — OXYGEN SATURATION: 91 %

## 2024-09-30 DIAGNOSIS — J44.9 CHRONIC OBSTRUCTIVE PULMONARY DISEASE, UNSPECIFIED: ICD-10-CM

## 2024-09-30 DIAGNOSIS — N18.9 CHRONIC KIDNEY DISEASE, UNSPECIFIED: ICD-10-CM

## 2024-09-30 DIAGNOSIS — Z99.81 DEPENDENCE ON SUPPLEMENTAL OXYGEN: ICD-10-CM

## 2024-09-30 DIAGNOSIS — J96.92 RESPIRATORY FAILURE, UNSPECIFIED WITH HYPERCAPNIA: ICD-10-CM

## 2024-09-30 DIAGNOSIS — M10.9 GOUT, UNSPECIFIED: ICD-10-CM

## 2024-09-30 DIAGNOSIS — I10 ESSENTIAL (PRIMARY) HYPERTENSION: ICD-10-CM

## 2024-09-30 PROCEDURE — 99214 OFFICE O/P EST MOD 30 MIN: CPT

## 2024-09-30 NOTE — HISTORY OF PRESENT ILLNESS
[Former] : former [< 20 pack-years] : < 20 pack-years [Never] : never [TextBox_4] : 82 year old woman returns for follow up.\par  \par  She was hospitalized at [facility].  She was diagnosed with CHF, COPD/emphysema, hypoxic hypercapnic chronic respiratory failure.  She was started on BIPAP and was discharged on home NIV, furosemide, spironolactone and bronchodilators.  Lower extremity edema resolved.\par  \par  Echo demonstrated diastolic dysfunction,hyperdynamic LV.  Dilated right ventricle\par  \par  She has been on diuretic furosemide and spironolactone.  Edema remains resolved at this time.\par  \par  She is using portable oxygen 3 L/min.\par  \par  She uses nebulized albuterol/ipratropium. and Symbicort 2 puffs twice per day\par  \par  \par  She remains on diuretic.\par  \par  She still has dyspnea and hypoxia on exertion.  She has been stable, respiratory status has improved\par  \par  She has obtained new  NIV and is using it every night.\par   \par  She has some rhinitis\par  \par  \par  She uses oxygen, walks well.\par  \par  Off oxygen, on room air her oxygen saturation drops to 70s .  It improves to 97% on oxygen 3 lpm\par  \par  She has an 8 mm lung nodule being followed with CT chest.  Stable on CT 4/2021.\par  \par  \par  She had repeat CT chest 05/22  that did not demonstrate suspicious lesions\par  \par  \par  She has had dyspnea on exertion \par  \par  She is using DuoNeb once per day and Symbicort MDI 2 puffs twice per day.  She remains on oxygen as before\par  \par  She has no pedal edema chest pain cough or sputum production\par  \par  PMH\par  \par  HTN [TextBox_11] : 0.5 [TextBox_13] : Spin 20 [YearQuit] : 1997 [Snoring] : no snoring [TextBox_19] : Chronic hypercapnic respiratory failure

## 2024-09-30 NOTE — DISCUSSION/SUMMARY
[FreeTextEntry1] : 83-year-old woman with severe COPD, severely diminished FEV1 less than 1 L, emphysema on CT scan of the chest, pulmonary hypertension and chronic hypercapnic respiratory failure  COPD emphysema on CT chest  Chronic hypoxic hypercapnic respiratory failure and chronic hypoxia  She is using NIV at night and uses oxygen in the day 3 L/min   She has received new NIV device and is using every night.  She has obtained humidification attachment   COPD: Remains symptomatic.  I suspect she is not gaining adequate benefit from metered-dose inhaler device and short acting nebulized bronchodilators  I ordered long-acting bronchodilators formoterol fumarate and Yupelri.  She did use them but did not note a significant benefit.  the cost was high as well  she has been using Symbicort 160/4.5 and Duoneb both BID  Her breathing is at baseline, with O2 desaturation with exertion and limited tolerance  She has had evaluation by Dr Lobo Morales  prior echo demonstrated  pulmonary HTN   She has edema of her ankles today.  This has been stable.  There is no pretibial edema  She will undergo follow-up CT chest as needed  continue current regimen  She was hospitalized at [facility]  due to pneumonia and CHF.  CT chest negative for PE.  She developed acute renal failure that improved  She uses oxygen and diuretics, continues inhaled bronchodilator regimen.    Her heart rhythm is irregular ranging from 40s to 80s  EKG demonstrated   She has been home since11/23  She uses oxygen 3 lpm through portable concentrator  Discussed at length with the patient and her daughter   Total time spent : 40 minutes Including: Preparation prior to visit - Reviewing prior record, results of tests and Consultation Reports as applicable Conducting an appropriate H & P during today's encounter Appropriate orders for tests, medications and procedures, as applicable Counseling patient  Note completion  Angel Amador MD

## 2024-09-30 NOTE — DISCUSSION/SUMMARY
[FreeTextEntry1] : 83-year-old woman with severe COPD, severely diminished FEV1 less than 1 L, emphysema on CT scan of the chest, pulmonary hypertension and chronic hypercapnic respiratory failure  COPD emphysema on CT chest  Chronic hypoxic hypercapnic respiratory failure and chronic hypoxia  She is using NIV at night and uses oxygen in the day 3 L/min   She has received new NIV device and is using every night.  She has obtained humidification attachment   COPD: Remains symptomatic.  I suspect she is not gaining adequate benefit from metered-dose inhaler device and short acting nebulized bronchodilators  I ordered long-acting bronchodilators formoterol fumarate and Yupelri.  She did use them but did not note a significant benefit.  the cost was high as well  she has been using Symbicort 160/4.5 and Duoneb both BID  Her breathing is at baseline, with O2 desaturation with exertion and limited tolerance  She has had evaluation by Dr Lobo Morales  prior echo demonstrated  pulmonary HTN   She has edema of her ankles today.  This has been stable.  There is no pretibial edema  She will undergo follow-up CT chest as needed  continue current regimen  She was hospitalized at [facility]  due to pneumonia and CHF.  CT chest negative for PE.  She developed acute renal failure that improved  She uses oxygen and diuretics, continues inhaled bronchodilator regimen.    Her heart rhythm is irregular ranging from 40s to 80s  EKG demonstrated   She has been home since11/23  She uses oxygen 3 lpm through portable concentrator  Discussed at length with the patient and her daughter   Total time spent : 40 minutes Including: Preparation prior to visit - Reviewing prior record, results of tests and Consultation Reports as applicable Conducting an appropriate H & P during today's encounter Appropriate orders for tests, medications and procedures, as applicable Counseling patient  Note completion  Angel Amador MD

## 2024-09-30 NOTE — HISTORY OF PRESENT ILLNESS
[Former] : former [< 20 pack-years] : < 20 pack-years [Never] : never [TextBox_4] : 82 year old woman returns for follow up.\par  \par  She was hospitalized at [facility].  She was diagnosed with CHF, COPD/emphysema, hypoxic hypercapnic chronic respiratory failure.  She was started on BIPAP and was discharged on home NIV, furosemide, spironolactone and bronchodilators.  Lower extremity edema resolved.\par  \par  Echo demonstrated diastolic dysfunction,hyperdynamic LV.  Dilated right ventricle\par  \par  She has been on diuretic furosemide and spironolactone.  Edema remains resolved at this time.\par  \par  She is using portable oxygen 3 L/min.\par  \par  She uses nebulized albuterol/ipratropium. and Symbicort 2 puffs twice per day\par  \par  \par  She remains on diuretic.\par  \par  She still has dyspnea and hypoxia on exertion.  She has been stable, respiratory status has improved\par  \par  She has obtained new  NIV and is using it every night.\par   \par  She has some rhinitis\par  \par  \par  She uses oxygen, walks well.\par  \par  Off oxygen, on room air her oxygen saturation drops to 70s .  It improves to 97% on oxygen 3 lpm\par  \par  She has an 8 mm lung nodule being followed with CT chest.  Stable on CT 4/2021.\par  \par  \par  She had repeat CT chest 05/22  that did not demonstrate suspicious lesions\par  \par  \par  She has had dyspnea on exertion \par  \par  She is using DuoNeb once per day and Symbicort MDI 2 puffs twice per day.  She remains on oxygen as before\par  \par  She has no pedal edema chest pain cough or sputum production\par  \par  PMH\par  \par  HTN [TextBox_11] : 0.5 [TextBox_13] : Spin 20 [YearQuit] : 1997 [Snoring] : no snoring [TextBox_19] : Chronic hypercapnic respiratory failure

## 2024-10-02 PROBLEM — M10.9 GOUT: Status: ACTIVE | Noted: 2024-10-02

## 2024-10-02 RX ORDER — ALLOPURINOL 100 MG/1
100 TABLET ORAL
Refills: 0 | Status: ACTIVE | COMMUNITY
Start: 2024-10-02

## 2024-10-10 ENCOUNTER — MED ADMIN CHARGE (OUTPATIENT)
Age: 84
End: 2024-10-10